# Patient Record
Sex: FEMALE | Race: WHITE | NOT HISPANIC OR LATINO
[De-identification: names, ages, dates, MRNs, and addresses within clinical notes are randomized per-mention and may not be internally consistent; named-entity substitution may affect disease eponyms.]

---

## 2018-06-22 ENCOUNTER — TRANSCRIPTION ENCOUNTER (OUTPATIENT)
Age: 63
End: 2018-06-22

## 2018-12-07 PROBLEM — Z00.00 ENCOUNTER FOR PREVENTIVE HEALTH EXAMINATION: Status: ACTIVE | Noted: 2018-12-07

## 2018-12-13 ENCOUNTER — OUTPATIENT (OUTPATIENT)
Dept: OUTPATIENT SERVICES | Facility: HOSPITAL | Age: 63
LOS: 1 days | Discharge: HOME | End: 2018-12-13

## 2018-12-13 ENCOUNTER — APPOINTMENT (OUTPATIENT)
Dept: OBGYN | Facility: CLINIC | Age: 63
End: 2018-12-13
Payer: COMMERCIAL

## 2018-12-13 VITALS — WEIGHT: 120 LBS | HEIGHT: 62 IN | BODY MASS INDEX: 22.08 KG/M2

## 2018-12-13 DIAGNOSIS — Z00.00 ENCOUNTER FOR GENERAL ADULT MEDICAL EXAMINATION WITHOUT ABNORMAL FINDINGS: ICD-10-CM

## 2018-12-13 LAB
BILIRUB UR QL STRIP: NORMAL
GLUCOSE UR-MCNC: NORMAL
HCG UR QL: NORMAL EU/DL
HGB UR QL STRIP.AUTO: NORMAL
KETONES UR-MCNC: NORMAL
LEUKOCYTE ESTERASE UR QL STRIP: NORMAL
NITRITE UR QL STRIP: NORMAL
PH UR STRIP: 7
PROT UR STRIP-MCNC: NORMAL
SP GR UR STRIP: 1

## 2018-12-13 PROCEDURE — 77085 DXA BONE DENSITY AXL VRT FX: CPT

## 2018-12-13 PROCEDURE — 76830 TRANSVAGINAL US NON-OB: CPT

## 2018-12-13 PROCEDURE — 81003 URINALYSIS AUTO W/O SCOPE: CPT | Mod: QW

## 2018-12-13 PROCEDURE — 93975 VASCULAR STUDY: CPT

## 2018-12-13 PROCEDURE — 99396 PREV VISIT EST AGE 40-64: CPT

## 2018-12-19 LAB — HPV HIGH+LOW RISK DNA PNL CVX: NOT DETECTED

## 2019-06-20 ENCOUNTER — RECORD ABSTRACTING (OUTPATIENT)
Age: 64
End: 2019-06-20

## 2019-06-20 DIAGNOSIS — Z78.9 OTHER SPECIFIED HEALTH STATUS: ICD-10-CM

## 2019-06-20 DIAGNOSIS — Z80.8 FAMILY HISTORY OF MALIGNANT NEOPLASM OF OTHER ORGANS OR SYSTEMS: ICD-10-CM

## 2019-06-20 DIAGNOSIS — Z83.49 FAMILY HISTORY OF OTHER ENDOCRINE, NUTRITIONAL AND METABOLIC DISEASES: ICD-10-CM

## 2019-06-20 RX ORDER — CALCIUM CARBONATE 260MG(650)
TABLET,CHEWABLE ORAL
Refills: 0 | Status: ACTIVE | COMMUNITY

## 2019-06-20 RX ORDER — LECITHIN 1200 MG
CAPSULE ORAL
Refills: 0 | Status: ACTIVE | COMMUNITY

## 2019-06-20 RX ORDER — CALCIUM CARBONATE 500(1250)
500 TABLET ORAL
Refills: 0 | Status: ACTIVE | COMMUNITY

## 2019-06-20 RX ORDER — VITAMIN E ACID SUCCINATE 268 MG
TABLET ORAL
Refills: 0 | Status: ACTIVE | COMMUNITY

## 2019-08-03 ENCOUNTER — APPOINTMENT (OUTPATIENT)
Dept: ENDOCRINOLOGY | Facility: CLINIC | Age: 64
End: 2019-08-03
Payer: COMMERCIAL

## 2019-08-03 VITALS
DIASTOLIC BLOOD PRESSURE: 92 MMHG | SYSTOLIC BLOOD PRESSURE: 139 MMHG | OXYGEN SATURATION: 97 % | BODY MASS INDEX: 21.83 KG/M2 | HEIGHT: 62 IN | WEIGHT: 118.6 LBS | HEART RATE: 102 BPM

## 2019-08-03 PROCEDURE — 99214 OFFICE O/P EST MOD 30 MIN: CPT

## 2019-08-03 NOTE — PHYSICAL EXAM
[Alert] : alert [Well Nourished] : well nourished [No Acute Distress] : no acute distress [Normal Sclera/Conjunctiva] : normal sclera/conjunctiva [Well Developed] : well developed [EOMI] : extra ocular movement intact [Normal Oropharynx] : the oropharynx was normal [No Proptosis] : no proptosis [No Thyroid Nodules] : there were no palpable thyroid nodules [Thyroid Not Enlarged] : the thyroid was not enlarged [No Respiratory Distress] : no respiratory distress [No Accessory Muscle Use] : no accessory muscle use [Clear to Auscultation] : lungs were clear to auscultation bilaterally [Normal Rate] : heart rate was normal  [Normal S1, S2] : normal S1 and S2 [Pedal Pulses Normal] : the pedal pulses are present [Regular Rhythm] : with a regular rhythm [No Edema] : there was no peripheral edema [Normal Bowel Sounds] : normal bowel sounds [Not Tender] : non-tender [Soft] : abdomen soft [Not Distended] : not distended [Post Cervical Nodes] : posterior cervical nodes [Anterior Cervical Nodes] : anterior cervical nodes [Axillary Nodes] : axillary nodes [No Spinal Tenderness] : no spinal tenderness [Normal] : normal and non tender [Spine Straight] : spine straight [Normal Gait] : normal gait [No Stigmata of Cushings Syndrome] : no stigmata of cushings syndrome [No Rash] : no rash [Normal Strength/Tone] : muscle strength and tone were normal [Normal Reflexes] : deep tendon reflexes were 2+ and symmetric [Oriented x3] : oriented to person, place, and time [No Tremors] : no tremors [Acanthosis Nigricans] : no acanthosis nigricans

## 2019-08-03 NOTE — ASSESSMENT
[FreeTextEntry1] : Pt with "cheating"on diet. Discussed increased chol FBS also increased. To focus on diet/exercise.TFTs are stable and mild TSH suppression with risks /benefits discussed

## 2019-12-04 ENCOUNTER — FORM ENCOUNTER (OUTPATIENT)
Age: 64
End: 2019-12-04

## 2019-12-05 ENCOUNTER — APPOINTMENT (OUTPATIENT)
Dept: OBGYN | Facility: CLINIC | Age: 64
End: 2019-12-05
Payer: COMMERCIAL

## 2019-12-05 ENCOUNTER — OUTPATIENT (OUTPATIENT)
Dept: OUTPATIENT SERVICES | Facility: HOSPITAL | Age: 64
LOS: 1 days | Discharge: HOME | End: 2019-12-05
Payer: COMMERCIAL

## 2019-12-05 VITALS — HEIGHT: 62 IN | BODY MASS INDEX: 21.71 KG/M2 | WEIGHT: 118 LBS

## 2019-12-05 DIAGNOSIS — Z12.31 ENCOUNTER FOR SCREENING MAMMOGRAM FOR MALIGNANT NEOPLASM OF BREAST: ICD-10-CM

## 2019-12-05 LAB
BILIRUB UR QL STRIP: NORMAL
CLARITY UR: CLEAR
GLUCOSE UR-MCNC: NORMAL
HCG UR QL: NORMAL EU/DL
HGB UR QL STRIP.AUTO: NORMAL
KETONES UR-MCNC: NORMAL
LEUKOCYTE ESTERASE UR QL STRIP: 500
NITRITE UR QL STRIP: NORMAL
PH UR STRIP: 6
PROT UR STRIP-MCNC: NORMAL
SP GR UR STRIP: 1.01

## 2019-12-05 PROCEDURE — 77066 DX MAMMO INCL CAD BI: CPT | Mod: 26

## 2019-12-05 PROCEDURE — 81003 URINALYSIS AUTO W/O SCOPE: CPT | Mod: QW

## 2019-12-05 PROCEDURE — G0279: CPT | Mod: 26

## 2019-12-05 PROCEDURE — 99214 OFFICE O/P EST MOD 30 MIN: CPT

## 2019-12-05 PROCEDURE — 76641 ULTRASOUND BREAST COMPLETE: CPT | Mod: 26,RT

## 2019-12-05 RX ORDER — ALPRAZOLAM 0.5 MG/1
0.5 TABLET ORAL
Qty: 2 | Refills: 0 | Status: ACTIVE | COMMUNITY
Start: 2019-12-05 | End: 1900-01-01

## 2019-12-10 LAB — HPV HIGH+LOW RISK DNA PNL CVX: NOT DETECTED

## 2020-01-09 ENCOUNTER — APPOINTMENT (OUTPATIENT)
Dept: OBGYN | Facility: CLINIC | Age: 65
End: 2020-01-09

## 2020-03-07 ENCOUNTER — APPOINTMENT (OUTPATIENT)
Dept: ENDOCRINOLOGY | Facility: CLINIC | Age: 65
End: 2020-03-07
Payer: COMMERCIAL

## 2020-03-07 VITALS
SYSTOLIC BLOOD PRESSURE: 140 MMHG | OXYGEN SATURATION: 98 % | WEIGHT: 121.6 LBS | HEIGHT: 62 IN | HEART RATE: 103 BPM | BODY MASS INDEX: 22.38 KG/M2 | DIASTOLIC BLOOD PRESSURE: 90 MMHG

## 2020-03-07 VITALS — DIASTOLIC BLOOD PRESSURE: 84 MMHG | SYSTOLIC BLOOD PRESSURE: 140 MMHG

## 2020-03-07 PROCEDURE — 99214 OFFICE O/P EST MOD 30 MIN: CPT

## 2020-03-07 NOTE — ASSESSMENT
[FreeTextEntry1] : Lipid levels were reviewed with patient and importance and function of LDL, HDL and triglycerides discussed. Methods to increase HDL (exercise, fish, beans, oat meal, legumes etc.) discussed with pt. in conjunction with measures to decrease LDL including diet and exercise. LDL/HDL is 124/86 which has improved.\par Pt. has a low TSH but normal T4 and T3. There are no signs or symptoms of hyperthyroidism. The risks of subclinical hyperthyroidism (BMD, cardiac effects, etc.) have been fully reviewed and pt. wishes to stay with current dose.

## 2020-10-10 ENCOUNTER — APPOINTMENT (OUTPATIENT)
Dept: ENDOCRINOLOGY | Facility: CLINIC | Age: 65
End: 2020-10-10
Payer: COMMERCIAL

## 2020-10-10 VITALS — DIASTOLIC BLOOD PRESSURE: 86 MMHG | SYSTOLIC BLOOD PRESSURE: 174 MMHG

## 2020-10-10 VITALS — DIASTOLIC BLOOD PRESSURE: 84 MMHG | SYSTOLIC BLOOD PRESSURE: 162 MMHG

## 2020-10-10 VITALS
BODY MASS INDEX: 23.27 KG/M2 | WEIGHT: 127.25 LBS | RESPIRATION RATE: 20 BRPM | DIASTOLIC BLOOD PRESSURE: 116 MMHG | SYSTOLIC BLOOD PRESSURE: 195 MMHG | HEART RATE: 103 BPM | TEMPERATURE: 97.8 F

## 2020-10-10 PROCEDURE — 99214 OFFICE O/P EST MOD 30 MIN: CPT

## 2020-10-10 NOTE — ASSESSMENT
[FreeTextEntry1] : The patient has a history of hypothyroidism though currently is clinically euthyroid with no hyperthyroid signs or symptoms. The patient's free T4 (1.6) and free T3 (3.3-->3.5) are in the normal range and TSH suppressed. Risks of subclinical hyperthyroidism (BMD,cardio etc.) discussed in detail and given clinical euthyroid state after discussion pt. is comfortable with current dose.\par Lipid levels were reviewed with patient and importance and function of LDL, HDL and triglycerides discussed. Methods to increase HDL (exercise, fish, beans, oat meal, legumes etc.) discussed with pt. in conjunction with measures to decrease LDL and triglycerides  including diet and exercise.LDL/HDL was 124/86--> 147/76. Current regimen of treatment to continue. Risks of statins were discussed in detail. \par Pt. has Impaired fasting glucose (102-->121) and  current HbA1c is 5.3 We have discussed diet/exercise and risks related to diabetes in great detail.\par \par \par \par \par

## 2021-05-20 ENCOUNTER — APPOINTMENT (OUTPATIENT)
Dept: ENDOCRINOLOGY | Facility: CLINIC | Age: 66
End: 2021-05-20
Payer: COMMERCIAL

## 2021-05-20 VITALS — WEIGHT: 123 LBS | HEIGHT: 62 IN | BODY MASS INDEX: 22.63 KG/M2

## 2021-05-20 PROCEDURE — G2012 BRIEF CHECK IN BY MD/QHP: CPT

## 2021-12-10 PROBLEM — M85.88 OSTEOPENIA OF LUMBAR SPINE: Status: ACTIVE | Noted: 2018-12-13

## 2021-12-10 PROBLEM — M85.851 OSTEOPENIA OF BOTH HIPS: Status: ACTIVE | Noted: 2018-12-13

## 2021-12-11 ENCOUNTER — APPOINTMENT (OUTPATIENT)
Dept: ENDOCRINOLOGY | Facility: CLINIC | Age: 66
End: 2021-12-11
Payer: COMMERCIAL

## 2021-12-11 VITALS
SYSTOLIC BLOOD PRESSURE: 166 MMHG | BODY MASS INDEX: 22.63 KG/M2 | DIASTOLIC BLOOD PRESSURE: 80 MMHG | TEMPERATURE: 97.8 F | WEIGHT: 123 LBS | HEIGHT: 62 IN

## 2021-12-11 DIAGNOSIS — M85.851 OTHER SPECIFIED DISORDERS OF BONE DENSITY AND STRUCTURE, RIGHT THIGH: ICD-10-CM

## 2021-12-11 DIAGNOSIS — M85.852 OTHER SPECIFIED DISORDERS OF BONE DENSITY AND STRUCTURE, RIGHT THIGH: ICD-10-CM

## 2021-12-11 DIAGNOSIS — M85.88 OTHER SPECIFIED DISORDERS OF BONE DENSITY AND STRUCTURE, OTHER SITE: ICD-10-CM

## 2021-12-11 PROCEDURE — 99213 OFFICE O/P EST LOW 20 MIN: CPT

## 2021-12-11 NOTE — ASSESSMENT
[FreeTextEntry1] : The patient has a history of hypothyroidism though currently is clinically euthyroid with no hypothyroid or hyperthyroid signs or symptoms. The patient's TSH,  free T4 and free T3 are all  in the normal range .Risks of subclinical hyperthyroidism (BMD,cardio etc.) and hypothyroidism ( fatigue , muscle aches, weight gain etc.)  discussed in detail and given clinical euthyroid state after discussion pt. is comfortable with current dose ( 100 mcg) \par Lipid levels were reviewed with patient and importance and function of LDL, HDL and triglycerides discussed. Methods to increase HDL (exercise, fish, beans, oat meal, legumes etc.) discussed with pt. in conjunction with measures to decrease LDL and triglycerides  including diet and exercise.LDL/HDL was 127/78 from 132/74  . Triglycerides were 69..Current regimen of diet/niacin to continue. Risks/benefits  of statins were discussed in detail. \par \par Pt. has Impaired fasting glucose (112 ) and  previous HbA1c is 5.2 We have discussed diet/exercise and risks related to diabetes in great detail.\par \par BP repeat 160/78\par \par \par

## 2022-07-30 ENCOUNTER — APPOINTMENT (OUTPATIENT)
Dept: ENDOCRINOLOGY | Facility: CLINIC | Age: 67
End: 2022-07-30

## 2022-07-30 VITALS
SYSTOLIC BLOOD PRESSURE: 180 MMHG | HEIGHT: 62 IN | OXYGEN SATURATION: 98 % | WEIGHT: 120 LBS | DIASTOLIC BLOOD PRESSURE: 84 MMHG | TEMPERATURE: 97.2 F | HEART RATE: 90 BPM | BODY MASS INDEX: 22.08 KG/M2

## 2022-07-30 PROCEDURE — 99213 OFFICE O/P EST LOW 20 MIN: CPT

## 2022-07-30 NOTE — ASSESSMENT
[FreeTextEntry1] : The patient has a history of hypothyroidism though currently is clinically euthyroid with no hypothyroid or hyperthyroid signs or symptoms. The patient's free T4 and free T3 are in the normal range and TSH gently suppressed.Risks of subclinical hyperthyroidism (BMD,cardio etc.) and hypothyroidism ( fatigue , muscle aches, weight gain etc.)  discussed in detail and given clinical euthyroid state after discussion pt. is comfortable with current dose. ( levothyroxine  100 mcg) \par Lipid levels were reviewed with patient and importance and function of LDL, HDL and triglycerides discussed. Methods to increase HDL (exercise, fish, beans, oat meal, legumes etc.) discussed with pt. in conjunction with measures to decrease LDL and triglycerides  including diet and exercise.LDL/HDL was 159/81 from 127/78 from 132/74  . Triglycerides were 69..Current regimen of diet/niacin to continue. Risks/benefits  of statins were discussed in detail. \par \par Pt. has Impaired fasting glucose (112 ---> 108  ) and  HbA1c is 5.2 We have discussed diet/exercise and risks related to diabetes in great detail.\par \par Pt had hearing aids but "old" so [place]. f/u with ENT in progress.\par Home BP OK but feels white coat hypertension repeat 170/78\par

## 2022-07-30 NOTE — PHYSICAL EXAM

## 2022-10-06 ENCOUNTER — APPOINTMENT (OUTPATIENT)
Dept: OBGYN | Facility: CLINIC | Age: 67
End: 2022-10-06

## 2022-10-06 VITALS — BODY MASS INDEX: 22.08 KG/M2 | WEIGHT: 120 LBS | HEIGHT: 62 IN

## 2022-10-06 DIAGNOSIS — N60.12 DIFFUSE CYSTIC MASTOPATHY OF RIGHT BREAST: ICD-10-CM

## 2022-10-06 DIAGNOSIS — N60.11 DIFFUSE CYSTIC MASTOPATHY OF RIGHT BREAST: ICD-10-CM

## 2022-10-06 DIAGNOSIS — Z01.411 ENCOUNTER FOR GYNECOLOGICAL EXAMINATION (GENERAL) (ROUTINE) WITH ABNORMAL FINDINGS: ICD-10-CM

## 2022-10-06 DIAGNOSIS — N95.2 POSTMENOPAUSAL ATROPHIC VAGINITIS: ICD-10-CM

## 2022-10-06 LAB
BILIRUB UR QL STRIP: NORMAL
CLARITY UR: CLEAR
COLLECTION METHOD: NORMAL
GLUCOSE UR-MCNC: NORMAL
HCG UR QL: 0.2 EU/DL
HGB UR QL STRIP.AUTO: NORMAL
KETONES UR-MCNC: NORMAL
LEUKOCYTE ESTERASE UR QL STRIP: NORMAL
NITRITE UR QL STRIP: NORMAL
PH UR STRIP: 5.5
PROT UR STRIP-MCNC: NORMAL
SP GR UR STRIP: 1.01

## 2022-10-06 PROCEDURE — 81002 URINALYSIS NONAUTO W/O SCOPE: CPT

## 2022-10-06 PROCEDURE — 99396 PREV VISIT EST AGE 40-64: CPT

## 2022-10-19 LAB — CYTOLOGY CVX/VAG DOC THIN PREP: ABNORMAL

## 2023-02-11 ENCOUNTER — APPOINTMENT (OUTPATIENT)
Dept: ENDOCRINOLOGY | Facility: CLINIC | Age: 68
End: 2023-02-11
Payer: MEDICARE

## 2023-02-11 VITALS
DIASTOLIC BLOOD PRESSURE: 90 MMHG | WEIGHT: 119 LBS | TEMPERATURE: 98.3 F | OXYGEN SATURATION: 97 % | HEART RATE: 127 BPM | BODY MASS INDEX: 21.77 KG/M2 | SYSTOLIC BLOOD PRESSURE: 150 MMHG

## 2023-02-11 PROCEDURE — 99213 OFFICE O/P EST LOW 20 MIN: CPT

## 2023-02-11 NOTE — ASSESSMENT
[FreeTextEntry1] : The patient has a history of hypothyroidism though currently is clinically euthyroid with no hypothyroid or hyperthyroid signs or symptoms. The patient's free T4 and free T3 are in the normal range and TSH gently suppressed.Risks of subclinical hyperthyroidism (BMD,cardio etc.) and hypothyroidism ( fatigue , muscle aches, weight gain etc.)  discussed in detail and given clinical euthyroid state after discussion pt. is comfortable with current dose. ( levothyroxine  100 mcg) but have suggested decrease to 88 mcg given mild TSH suppression. \par Lipid levels were reviewed with patient and importance and function of LDL, HDL and triglycerides discussed. Methods to increase HDL (exercise, fish, beans, oat meal, legumes etc.) discussed with pt. in conjunction with measures to decrease LDL and triglycerides  including diet and exercise.LDL/HDL was 160/86 from 159/81 & 127/78  . Triglycerides were 73 from  69..Current regimen of diet/niacin to continue. Risks/benefits  of statins were discussed in detail and zocor 5 mg suggested. .Pt still refuses\par  \par \par Pt. has Impaired fasting glucose (112 ---> 108-> 105   ) and  HbA1c is stable at  5.2 We have discussed diet/exercise and risks related to diabetes in great detail.\par \par Pt had hearing aids but "old" so [place]. f/u with ENT in progress.\par Home BP OK but feels white coat hypertension. \par Recheck BP was 148/86.

## 2023-09-02 PROBLEM — M85.80 OSTEOPENIA: Status: ACTIVE | Noted: 2022-10-06

## 2023-09-09 ENCOUNTER — APPOINTMENT (OUTPATIENT)
Dept: ENDOCRINOLOGY | Facility: CLINIC | Age: 68
End: 2023-09-09
Payer: MEDICARE

## 2023-09-09 VITALS
SYSTOLIC BLOOD PRESSURE: 182 MMHG | DIASTOLIC BLOOD PRESSURE: 98 MMHG | WEIGHT: 121 LBS | HEART RATE: 100 BPM | HEIGHT: 62 IN | BODY MASS INDEX: 22.26 KG/M2 | OXYGEN SATURATION: 98 %

## 2023-09-09 DIAGNOSIS — M85.80 OTHER SPECIFIED DISORDERS OF BONE DENSITY AND STRUCTURE, UNSPECIFIED SITE: ICD-10-CM

## 2023-09-09 PROCEDURE — 99213 OFFICE O/P EST LOW 20 MIN: CPT

## 2023-09-09 NOTE — ASSESSMENT
[FreeTextEntry1] : The patient has a history of hypothyroidism though currently is clinically euthyroid with no hypothyroid or hyperthyroid signs or symptoms. The patient's TSH, free T4 and free T3 are all in the normal range. Risks of subclinical hyperthyroidism (BMD,cardio etc.) and hypothyroidism (fatigue, muscle aches, weight gain etc.)  discussed in detail and given clinical euthyroid state after discussion pt. is comfortable with current dose (levothyroxine 88 mcg .  Lipid levels were reviewed with patient and importance and function of LDL, HDL and triglycerides discussed. Methods to increase HDL (exercise, fish, beans, oatmeal, legumes etc.) discussed with pt. in conjunction with measures to decrease LDL and triglycerides including diet and exercise.LDL/HDL was 179/80 from 160/86, 159/81 & 127/78. Triglycerides were 79. Current regimen of diet/niacin to continue. Risks/benefits of statins were discussed in detail and Zocor 5 mg suggested. Pt still refuses.   Pt. has history of hypertension. Risks related to hypertension including cardiac, renal and vascular fully discussed. Diet discussed as well. Medications and importance of compliance reviewed. recheck 165/82. Pt. has Impaired fasting glucose (112 ---> 108-> 105-> 109) and HbA1c is stable at 5.2 We have discussed diet/exercise and risks related to diabetes in great detail.  Pt had hearing aids but "old" so [place]. f/u with ENT in progress. Pt. has history of hypertension. Risks related to hypertension including cardiac, renal and vascular fully discussed. Diet discussed as well. Medications and importance of compliance reviewed. Home BP OK but feels white coat hypertension.

## 2024-04-05 PROBLEM — E04.9 GOITER: Status: ACTIVE | Noted: 2019-06-20

## 2024-04-05 PROBLEM — R73.01 IMPAIRED FASTING GLUCOSE: Status: ACTIVE | Noted: 2021-12-10

## 2024-04-05 PROBLEM — E78.00 HIGH BLOOD CHOLESTEROL: Status: ACTIVE | Noted: 2019-06-20

## 2024-04-06 ENCOUNTER — APPOINTMENT (OUTPATIENT)
Dept: ENDOCRINOLOGY | Facility: CLINIC | Age: 69
End: 2024-04-06
Payer: MEDICARE

## 2024-04-06 VITALS
HEIGHT: 62 IN | WEIGHT: 123 LBS | DIASTOLIC BLOOD PRESSURE: 100 MMHG | HEART RATE: 100 BPM | SYSTOLIC BLOOD PRESSURE: 148 MMHG | OXYGEN SATURATION: 98 % | BODY MASS INDEX: 22.63 KG/M2

## 2024-04-06 DIAGNOSIS — E04.9 NONTOXIC GOITER, UNSPECIFIED: ICD-10-CM

## 2024-04-06 DIAGNOSIS — E78.00 PURE HYPERCHOLESTEROLEMIA, UNSPECIFIED: ICD-10-CM

## 2024-04-06 DIAGNOSIS — R73.01 IMPAIRED FASTING GLUCOSE: ICD-10-CM

## 2024-04-06 PROCEDURE — 99214 OFFICE O/P EST MOD 30 MIN: CPT

## 2024-04-06 RX ORDER — LEVOTHYROXINE SODIUM 0.09 MG/1
88 TABLET ORAL
Qty: 90 | Refills: 3 | Status: ACTIVE | COMMUNITY
Start: 1900-01-01 | End: 1900-01-01

## 2024-04-06 NOTE — ASSESSMENT
[FreeTextEntry1] : The patient has a history of hypothyroidism though currently is clinically euthyroid with no hypothyroid or hyperthyroid signs or symptoms. The patient's TSH, free T4 and free T3 are all in the normal range. Risks of subclinical hyperthyroidism (BMD, cardio etc.) and hypothyroidism (fatigue, muscle aches, weight gain etc.)  discussed in detail and given clinical euthyroid state after discussion pt. is comfortable with current dose (levothyroxine 88 mcg .  Lipid levels were reviewed with patient and importance and function of LDL, HDL and triglycerides discussed. Methods to increase HDL (exercise, fish, beans, oatmeal, legumes etc.) discussed with pt. in conjunction with measures to decrease LDL and triglycerides including diet and exercise. LDL/HDL was 112/71 from 179/80, 160/86, 159/81 & 127/78. Triglycerides were 79. Current regimen of diet/niacin to continue. Risks/benefits of statins were discussed in detail and Zocor 5 mg suggested previously but taking red yeast  rice. and Co Q 10.     Pt. has history of hypertension. Risks related to hypertension including cardiac, renal and vascular fully discussed. Diet discussed as well. Medications and importance of compliance reviewed. recheck 165/82. Pt. has Impaired fasting glucose (112 ---> 108-> 105-> 109-> 107) and HbA1c is 5.6 from 5.2 We have discussed diet/exercise and risks related to diabetes in great detail.  Pt had hearing aids but "old" so [employer] [place]. f/u with ENT in progress. Pt. has history of hypertension. Risks related to hypertension including cardiac, renal and vascular fully discussed. Diet discussed as well. Medications and importance of compliance reviewed. Home BP OK but feels white coat hypertension.  BP recheck was 138/86.

## 2024-11-23 ENCOUNTER — APPOINTMENT (OUTPATIENT)
Dept: ENDOCRINOLOGY | Facility: CLINIC | Age: 69
End: 2024-11-23
Payer: MEDICARE

## 2024-11-23 VITALS
SYSTOLIC BLOOD PRESSURE: 200 MMHG | WEIGHT: 125 LBS | DIASTOLIC BLOOD PRESSURE: 103 MMHG | HEIGHT: 62 IN | HEART RATE: 109 BPM | BODY MASS INDEX: 23 KG/M2

## 2024-11-23 DIAGNOSIS — R73.01 IMPAIRED FASTING GLUCOSE: ICD-10-CM

## 2024-11-23 DIAGNOSIS — M85.80 OTHER SPECIFIED DISORDERS OF BONE DENSITY AND STRUCTURE, UNSPECIFIED SITE: ICD-10-CM

## 2024-11-23 DIAGNOSIS — E04.9 NONTOXIC GOITER, UNSPECIFIED: ICD-10-CM

## 2024-11-23 DIAGNOSIS — E78.00 PURE HYPERCHOLESTEROLEMIA, UNSPECIFIED: ICD-10-CM

## 2024-11-23 PROCEDURE — 99213 OFFICE O/P EST LOW 20 MIN: CPT

## 2025-06-28 ENCOUNTER — APPOINTMENT (OUTPATIENT)
Dept: ENDOCRINOLOGY | Facility: CLINIC | Age: 70
End: 2025-06-28
Payer: MEDICARE

## 2025-06-28 VITALS
WEIGHT: 125 LBS | OXYGEN SATURATION: 98 % | BODY MASS INDEX: 23 KG/M2 | SYSTOLIC BLOOD PRESSURE: 140 MMHG | HEART RATE: 110 BPM | DIASTOLIC BLOOD PRESSURE: 90 MMHG | HEIGHT: 62 IN

## 2025-06-28 PROCEDURE — 99214 OFFICE O/P EST MOD 30 MIN: CPT
